# Patient Record
(demographics unavailable — no encounter records)

---

## 2017-04-25 NOTE — ED HEADACHE COMPLAINT
History of Present Illness
 
General
Chief Complaint: General Adult
Stated Complaint: MIGRANE/BS 
Source: patient
Exam Limitations: no limitations
 
Vital Signs & Intake/Output
Vital Signs & Intake/Output
 Vital Signs
 
 
Date Time Temp Pulse Resp B/P B/P Pulse O2 O2 Flow FiO2
 
     Mean Ox Delivery Rate 
 
04/25 1650 98.5 86 18 128/84  99 Room Air  
 
 
Allergies
Coded Allergies:
crab (HIVES 04/25/17)
ibuprofen (HIVES 03/17/16)
 
Reconcile Medications
Estrates H.s. (Estrostep Fe-28 Tablet) 5-7-9-7 TABLET   1 TAB PO DAILY BIRTH 
CONTROL  (Reported)
Insulin Aspart (Novolog) 100 UNIT/ML VIAL INSULIN PUMP  (Reported)
Naproxen (Naprosyn) 500 MG TABLET   1 TAB PO BID PRN PAIN
 
Triage Note:
PT TO TRIAGE WITH C/O MIGRAINE SINCE THIS MORNING.
 NO OTHER COMPLAINTS. HX OF DIABETES,  IN
 TRIAGE. VSS.
Triage Nurses Notes Reviewed? yes
Pregnant: No
Patient currently breastfeeds: No
HPI:
This patient is a 22-year-old female with a past medical history including 
diabetes and migraine headaches who presented to the emergency department today 
for evaluation of a headache.  The patient reported that her blood sugar at home
was over 300.  She reported that at 11:00 when she woke up this morning she 
noticed a frontal headache that has gotten worse through the day.  The pain is 
currently an 8 out of 10 and she also feels the pain behind her left eye.  She 
reported, "it feels like mastitis strained."  She reported that the pain is 
throbbing and sharp.  She denied any dizziness, lightheadedness, visual changes,
chest pain, difficulty breathing, abdominal pain, nausea, vomiting, or any 
numbness or tingling her extremities.  She reported that she was given migraine 
medication prescribed by her doctor in the past, but reported that they never 
refilled these prescriptions.  She tried taking ibuprofen earlier today at 
around 11:30 with mild relief of her symptoms.
 
Past History
 
Travel History
Traveled to [place] past 21 day No
 
Medical History
Any Pertinent Medical History? see below for history
Neurological: migraine
EENT: NONE
Cardiovascular: NONE
Respiratory: NONE
Gastrointestinal: NONE
Hepatic: NONE
Renal: NONE
Musculoskeletal: NONE
Psychiatric: NONE
Endocrine: diabetes
Blood Disorders: NONE
Cancer(s): NONE
GYN/Reproductive: NONE
 
Surgical History
Surgical History: N
 
Psychosocial History
What is your primary language English
Tobacco Use: Never used
 
Family History
Hx Contributory? No
 
Review of Systems
 
Review of Systems
Constitutional:
Reports: no symptoms. 
Eyes:
Reports: no symptoms. 
Ears, Nose, Throat, Mouth:
Reports: no symptoms. 
Respiratory:
Reports: no symptoms. 
Cardiovascular:
Reports: no symptoms. 
Gastrointestinal/Abdominal:
Reports: no symptoms. 
Musculoskeletal:
Reports: no symptoms. 
Skin:
Reports: no symptoms. 
Neurological/Psychological:
Reports: see HPI. 
All Other Systems: Reviewed and Negative
 
Physical Exam
 
Physical Exam
Cranial Nerves: normal hearing, normal speech, PERRL
Comments:
Well-developed well-nourished person in no acute distress
HEENT: Normal EENT exam, head normocephalic, moist mucous membranes
PERRLA bilaterally.  EOMI bilaterally
Neck: Supple, no lymphadenopathy
Back: Normal gait
Cardiovascular: Regular rate and rhythm with no murmurs, rubs, or gallops
Respiratory: No respiratory distress. Breath sounds clear to auscultation 
bilaterally with no wheezes, rales, rhonchi
Extremity: Normal and equal pulses
Neuro: Alert oriented x3, motor sensory normal, cranial nerves II through XII 
grossly intact.
Skin: No appreciable rash on exposed skin, skin is warm and dry.
Psych: Mood and affect is normal
 
Core Measures
Severe Sepsis Present: No
Septic Shock Present: No
 
Progress
Differential Diagnosis: carotid dissection, cav sinus thromb, cluster HA, 
encephalitis, IC mass/tumor, intracranial Hem., migraine HA, sinusitis, 
subarach. Hem., tension HA, temporal arteritis, TMJ syndrome
Plan of Care:
 Current Medications
 
 
  Sig/Hong Start time  Last
 
Medication Dose  Stop Time Status Admin
 
Morphine Sulfate 2 MG ONCE ONE 04/25 1715 CAN 
 
(Morphine)   04/25 1716  
 
 
Comments:
4/25/2017 6:13:25 PM the patient is sitting up comfortably on the stretcher in 
no acute distress and nontoxic-appearing.  She is requesting to go home at this 
time.  Blood sugar down to 158.  RN was unable to get IV access for fluid.  
Patient was given Fioricet.  This patient used to be on naproxen for her 
migraines which worked for her.  She was requesting a refill of this 
prescription.
 
Departure
 
Departure
Disposition: HOME OR SELF CARE
Condition: Stable
Clinical Impression
Primary Impression: Migraine
Qualifiers:  Migraine type: unspecified Status migrainosus presence: without 
status migrainosus Intractability: not intractable Qualified Code: G43.909 - 
Migraine, unspecified, not intractable, without status migrainosus
Referrals:
MURIEL MIRANDA,DANY MORALES (PCP/Family)
 
Additional Instructions:
Take medication for migraine as prescribed.  Follow-up with both your primary 
care physician as well as with your neurologist.  Return for any worsening 
symptoms or concerns.
Departure Forms:
Customer Survey
General Discharge Information
Prescriptions:
Current Visit Scripts
Naproxen (Naprosyn) 1 TAB PO BID PRN PAIN 
     #20 TAB